# Patient Record
Sex: MALE | Race: ASIAN | NOT HISPANIC OR LATINO | Employment: UNEMPLOYED | URBAN - METROPOLITAN AREA
[De-identification: names, ages, dates, MRNs, and addresses within clinical notes are randomized per-mention and may not be internally consistent; named-entity substitution may affect disease eponyms.]

---

## 2018-02-19 ENCOUNTER — NON-PROVIDER VISIT (OUTPATIENT)
Dept: URGENT CARE | Facility: PHYSICIAN GROUP | Age: 33
End: 2018-02-19

## 2018-02-19 DIAGNOSIS — Z02.1 PRE-EMPLOYMENT DRUG SCREENING: ICD-10-CM

## 2018-02-19 LAB
AMP AMPHETAMINE: NORMAL
BAR BARBITURATES: NORMAL
BREATH ALCOHOL COMMENT: NORMAL
BZO BENZODIAZEPINES: NORMAL
COC COCAINE: NORMAL
INT CON NEG: NEGATIVE
INT CON POS: POSITIVE
MDMA ECSTASY: NORMAL
MET METHAMPHETAMINES: NORMAL
MTD METHADONE: NORMAL
OPI OPIATES: NORMAL
OXY OXYCODONE: NORMAL
PCP PHENCYCLIDINE: NORMAL
POC BREATHALIZER: 0 PERCENT (ref 0–0.01)
POC URINE DRUG SCREEN OCDRS: NORMAL
THC: NORMAL

## 2018-02-19 PROCEDURE — 82075 ASSAY OF BREATH ETHANOL: CPT | Performed by: FAMILY MEDICINE

## 2018-02-19 PROCEDURE — 80305 DRUG TEST PRSMV DIR OPT OBS: CPT | Performed by: FAMILY MEDICINE

## 2018-02-19 NOTE — PROGRESS NOTES
Ekta Stone is a 33 y.o. male here for a non-provider visit for PPD placement -- Step 1 of 2    Reason for PPD:  work requirement    1. TB evaluation questionnaire completed by patient? Yes      -  If any answers marked yes did you contact a provider prior to placing? Not Indicated  2.  Patient notified to return to clinic for reading on: 2/21/18- 2/22/18  3.  PPD Placement documentation completed on TB evaluation questionnaire? Yes  4.  Location of TB evaluation questionnaire filed:

## 2018-02-21 ENCOUNTER — OFFICE VISIT (OUTPATIENT)
Dept: OCCUPATIONAL MEDICINE | Facility: CLINIC | Age: 33
End: 2018-02-21

## 2018-02-21 ENCOUNTER — NON-PROVIDER VISIT (OUTPATIENT)
Dept: URGENT CARE | Facility: PHYSICIAN GROUP | Age: 33
End: 2018-02-21

## 2018-02-21 VITALS
HEART RATE: 82 BPM | BODY MASS INDEX: 37.22 KG/M2 | HEIGHT: 70 IN | RESPIRATION RATE: 16 BRPM | OXYGEN SATURATION: 96 % | TEMPERATURE: 98.1 F | WEIGHT: 260 LBS | DIASTOLIC BLOOD PRESSURE: 86 MMHG | SYSTOLIC BLOOD PRESSURE: 132 MMHG

## 2018-02-21 DIAGNOSIS — Z11.1 ENCOUNTER FOR PPD TEST: ICD-10-CM

## 2018-02-21 DIAGNOSIS — Z02.1 PHYSICAL EXAM, PRE-EMPLOYMENT: ICD-10-CM

## 2018-02-21 LAB — TB WHEAL 3D P 5 TU DIAM: 0 MM

## 2018-02-21 PROCEDURE — 86580 TB INTRADERMAL TEST: CPT | Performed by: PHYSICIAN ASSISTANT

## 2018-02-21 PROCEDURE — 8915 PR COMPREHENSIVE PHYSICAL: Performed by: PREVENTIVE MEDICINE

## 2018-02-21 NOTE — PROGRESS NOTES
Ekta Stone is a 33 y.o. male here for a non-provider visit for PPD reading -- Step 1 of 2.      1.  Resulted in Epic under enter/edit results? Yes   2.  TB evaluation questionnaire scanned into chart and original given to patient?yes    3. Was induration greater than 0 mm? No.    If Step 1 of 2, when is patient returning for second step (delete if N/A): 7 days    Routed to PCP? No

## 2018-02-26 ENCOUNTER — NON-PROVIDER VISIT (OUTPATIENT)
Dept: URGENT CARE | Facility: PHYSICIAN GROUP | Age: 33
End: 2018-02-26

## 2018-02-26 DIAGNOSIS — Z11.1 ENCOUNTER FOR PPD TEST: ICD-10-CM

## 2018-02-26 PROCEDURE — 86580 TB INTRADERMAL TEST: CPT | Performed by: PHYSICIAN ASSISTANT

## 2018-02-26 NOTE — PROGRESS NOTES
Ekta Stone is a 33 y.o. male here for a non-provider visit for PPD placement -- Step 1 of 2    Reason for PPD:  work requirement    1. TB evaluation questionnaire completed by patient? Yes      -  If any answers marked yes did you contact a provider prior to placing? Not Indicated  2.  Patient notified to return to clinic for reading on: 2/28/18  3.  PPD Placement documentation completed on TB evaluation questionnaire? Yes  4.  Location of TB evaluation questionnaire filed:

## 2018-02-28 ENCOUNTER — NON-PROVIDER VISIT (OUTPATIENT)
Dept: URGENT CARE | Facility: PHYSICIAN GROUP | Age: 33
End: 2018-02-28

## 2018-02-28 LAB — TB WHEAL 3D P 5 TU DIAM: NORMAL MM

## 2018-02-28 NOTE — PROGRESS NOTES
Ekta Stone is a 33 y.o. male here for a non-provider visit for PPD reading -- Step 2 of 2.      1.  Resulted in Epic under enter/edit results? Yes   2.  TB evaluation questionnaire scanned into chart and original given to patient?Yes      3. Was induration greater than 0 mm? 10mm       Routed to PCP? No

## 2019-03-13 PROCEDURE — 99285 EMERGENCY DEPT VISIT HI MDM: CPT

## 2019-03-13 PROCEDURE — 93005 ELECTROCARDIOGRAM TRACING: CPT | Performed by: EMERGENCY MEDICINE

## 2019-03-13 PROCEDURE — 93005 ELECTROCARDIOGRAM TRACING: CPT

## 2019-03-14 ENCOUNTER — APPOINTMENT (OUTPATIENT)
Dept: RADIOLOGY | Facility: MEDICAL CENTER | Age: 34
End: 2019-03-14
Attending: EMERGENCY MEDICINE

## 2019-03-14 ENCOUNTER — HOSPITAL ENCOUNTER (EMERGENCY)
Facility: MEDICAL CENTER | Age: 34
End: 2019-03-14
Attending: EMERGENCY MEDICINE

## 2019-03-14 VITALS
DIASTOLIC BLOOD PRESSURE: 112 MMHG | HEART RATE: 91 BPM | WEIGHT: 272.27 LBS | HEIGHT: 70 IN | BODY MASS INDEX: 38.98 KG/M2 | OXYGEN SATURATION: 95 % | RESPIRATION RATE: 2 BRPM | TEMPERATURE: 97.8 F | SYSTOLIC BLOOD PRESSURE: 156 MMHG

## 2019-03-14 DIAGNOSIS — R07.81 PLEURITIC CHEST PAIN: ICD-10-CM

## 2019-03-14 LAB
ALBUMIN SERPL BCP-MCNC: 4.5 G/DL (ref 3.2–4.9)
ALBUMIN/GLOB SERPL: 1.6 G/DL
ALP SERPL-CCNC: 73 U/L (ref 30–99)
ALT SERPL-CCNC: 36 U/L (ref 2–50)
ANION GAP SERPL CALC-SCNC: 7 MMOL/L (ref 0–11.9)
AST SERPL-CCNC: 27 U/L (ref 12–45)
BASOPHILS # BLD AUTO: 0.6 % (ref 0–1.8)
BASOPHILS # BLD: 0.06 K/UL (ref 0–0.12)
BILIRUB SERPL-MCNC: 0.6 MG/DL (ref 0.1–1.5)
BUN SERPL-MCNC: 16 MG/DL (ref 8–22)
CALCIUM SERPL-MCNC: 9.6 MG/DL (ref 8.5–10.5)
CHLORIDE SERPL-SCNC: 104 MMOL/L (ref 96–112)
CO2 SERPL-SCNC: 23 MMOL/L (ref 20–33)
CREAT SERPL-MCNC: 1.01 MG/DL (ref 0.5–1.4)
D DIMER PPP IA.FEU-MCNC: <0.4 UG/ML (FEU) (ref 0–0.5)
EKG IMPRESSION: NORMAL
EOSINOPHIL # BLD AUTO: 0.33 K/UL (ref 0–0.51)
EOSINOPHIL NFR BLD: 3.2 % (ref 0–6.9)
ERYTHROCYTE [DISTWIDTH] IN BLOOD BY AUTOMATED COUNT: 40.9 FL (ref 35.9–50)
GLOBULIN SER CALC-MCNC: 2.9 G/DL (ref 1.9–3.5)
GLUCOSE SERPL-MCNC: 113 MG/DL (ref 65–99)
HCT VFR BLD AUTO: 49.7 % (ref 42–52)
HGB BLD-MCNC: 16.9 G/DL (ref 14–18)
IMM GRANULOCYTES # BLD AUTO: 0.04 K/UL (ref 0–0.11)
IMM GRANULOCYTES NFR BLD AUTO: 0.4 % (ref 0–0.9)
LIPASE SERPL-CCNC: 26 U/L (ref 11–82)
LYMPHOCYTES # BLD AUTO: 4.11 K/UL (ref 1–4.8)
LYMPHOCYTES NFR BLD: 39.6 % (ref 22–41)
MCH RBC QN AUTO: 30.1 PG (ref 27–33)
MCHC RBC AUTO-ENTMCNC: 34 G/DL (ref 33.7–35.3)
MCV RBC AUTO: 88.4 FL (ref 81.4–97.8)
MONOCYTES # BLD AUTO: 0.96 K/UL (ref 0–0.85)
MONOCYTES NFR BLD AUTO: 9.2 % (ref 0–13.4)
NEUTROPHILS # BLD AUTO: 4.88 K/UL (ref 1.82–7.42)
NEUTROPHILS NFR BLD: 47 % (ref 44–72)
NRBC # BLD AUTO: 0 K/UL
NRBC BLD-RTO: 0 /100 WBC
PLATELET # BLD AUTO: 308 K/UL (ref 164–446)
PMV BLD AUTO: 8.9 FL (ref 9–12.9)
POTASSIUM SERPL-SCNC: 3.8 MMOL/L (ref 3.6–5.5)
PROT SERPL-MCNC: 7.4 G/DL (ref 6–8.2)
RBC # BLD AUTO: 5.62 M/UL (ref 4.7–6.1)
SODIUM SERPL-SCNC: 134 MMOL/L (ref 135–145)
TROPONIN I SERPL-MCNC: <0.01 NG/ML (ref 0–0.04)
WBC # BLD AUTO: 10.4 K/UL (ref 4.8–10.8)

## 2019-03-14 PROCEDURE — 36415 COLL VENOUS BLD VENIPUNCTURE: CPT

## 2019-03-14 PROCEDURE — 71046 X-RAY EXAM CHEST 2 VIEWS: CPT

## 2019-03-14 PROCEDURE — 80053 COMPREHEN METABOLIC PANEL: CPT

## 2019-03-14 PROCEDURE — 84484 ASSAY OF TROPONIN QUANT: CPT

## 2019-03-14 PROCEDURE — 93005 ELECTROCARDIOGRAM TRACING: CPT | Performed by: EMERGENCY MEDICINE

## 2019-03-14 PROCEDURE — 83690 ASSAY OF LIPASE: CPT

## 2019-03-14 PROCEDURE — 85379 FIBRIN DEGRADATION QUANT: CPT

## 2019-03-14 PROCEDURE — 85025 COMPLETE CBC W/AUTO DIFF WBC: CPT

## 2019-03-14 NOTE — ED PROVIDER NOTES
ED Provider Note    CHIEF COMPLAINT  Chief Complaint   Patient presents with   • Chest Pain     Pt. states centralized chest pressure for the past two days. Pt. states accompanying SOB. Pt. does not have an increased RR or any s/s of diff breathing.    • Back Pain     Pt. also states centralized chest pain that is non-radiating from his chest.        FRANCIA Stone is a 34 y.o. male who presents to the emergency department with chief complaint of 2 days of some centralized sharp stabbing pain that radiates to the back.  He denies any abdominal pain or nausea or vomiting.  [place] and is been feeling just generally short of breath feels like he does get a little worse when he goes up 2 flights of stairs but he denies any chest pain associated with this.  He denies any leg swelling or hemoptysis any fevers or chills.  He states he did just get over a cold about a month ago but states he is been doing better since then.  He does not smoke cigarettes he has not noticed any leg swelling no abdominal pain.    REVIEW OF SYSTEMS  Positives as above. Pertinent negatives include leg swelling hemoptysis abdominal pain nausea vomiting diaphoresis  All other review of systems are negative    PAST MEDICAL HISTORY       SOCIAL HISTORY  Social History     Social History Main Topics   • Smoking status: Never Smoker   • Smokeless tobacco: Never Used   • Alcohol use Yes      Comment: Occassionally   • Drug use: No   • Sexual activity: Not on file       SURGICAL HISTORY  patient denies any surgical history    CURRENT MEDICATIONS  Home Medications     Reviewed by Karen Karimi R.N. (Registered Nurse) on 03/13/19 at 2220  Med List Status: Partial   Medication Last Dose Status        Patient Isai Taking any Medications                       ALLERGIES  No Known Allergies    PHYSICAL EXAM  VITAL SIGNS: /112   Pulse (!) 105   Temp 36.6 °C (97.8 °F)  "(Temporal)   Resp 18   Ht 1.778 m (5' 10\")   Wt 123.5 kg (272 lb 4.3 oz)   SpO2 100%   BMI 39.07 kg/m²    Pulse ox interpretation: I interpret this pulse ox as normal.  Constitutional: Alert in no apparent distress.  HENT: Normocephalic atraumatic, MMM  Eyes: PER, Conjunctiva normal, Non-icteric.   Neck: Normal range of motion, No tenderness, Supple, No stridor.   Cardiovascular: Regular rate and rhythm, no murmurs.   Thorax & Lungs: Normal breath sounds, No respiratory distress, No wheezing, No chest tenderness.   Abdomen: Bowel sounds normal, Soft, No tenderness, No pulsatile masses. No peritoneal signs.  Skin: Warm, Dry, No erythema, No rash.   Back: No bony tenderness, No CVA tenderness.   Extremities: Intact distal pulses, No edema, No tenderness, No cyanosis  Neurologic: Alert and oriented x3, No focal deficits noted.   Psychiatric: Affect normal, Judgment normal, Mood normal.       DIFFERENTIAL DIAGNOSIS AND WORK UP PLAN    This is a 34 y.o. male who presents with some atypical chest pain rating to the back he is otherwise well-appearing he was tachycardic on arrival and when he gets a little anxious his heart rate goes up but otherwise his examination is within normal limits no abdominal tenderness normal distal pulses low concern for ACS was could be an underlying pulmonary embolism versus just getting acclimated to the elevation versus bronchitis esophagitis peptic ulcer disease.  Will evaluate with EKG laboratory analysis chest x-ray and reassess  Patient states he is already feeling better knowing that his EKG looked okay which is what the triage nurse told him    DIAGNOSTIC STUDIES / PROCEDURES    EKG  Results for orders placed or performed during the hospital encounter of 03/14/19   EKG   Result Value Ref Range    Report       [facility] Emergency Dept.    Test Date:  2019-03-14  Pt Name:    ERICK WALLACE               Department: ER  MRN:        [id number]                      " "Room:       [facility]  Gender:     Male                         Technician: [id number]  :        1985                   Requested By:SONIYA GUARDADO  Order #:    [id number]                    Reading MD: Soniya Guardado MD    Measurements  Intervals                                Axis  Rate:       80                           P:          -8  AK:         180                          QRS:        36  QRSD:       72                           T:          9  QT:         364  QTc:        420    Interpretive Statements  SINUS RHYTHM - no st elevations or depression no abnormal t wave inversions,  normal intervals normal axis, baseline artiface I, II and III  Compared to ECG 2019 22:26:09  No significant changes    Electronically Signed On 3- 2:55:07 PDT by Soniya Guardado MD         LABS  Pertinent Lab Findings  CBC CMP lipase troponin d-dimer all within normal limits      RADIOLOGY  DX-CHEST-2 VIEWS   Final Result      Negative two views of the chest.        The radiologist's interpretation of all radiological studies have been reviewed by me.      COURSE & MEDICAL DECISION MAKING  Pertinent Labs & Imaging studies reviewed. (See chart for details)    2:50 AM  Reassessed patient the bedside he is resting comfortably he is having some pleuritic chest pain with no hypoxia no evidence of pulmonary embolism he had the pain on and off for 2 days with single troponin should be sufficient as well as an EKG which is within normal limits.  His heart score is 3 but is otherwise well-appearing.  I discussed with him this may be secondary to his recent infection which is leading to the pleurisy or the change in the altitude but he should take it easy I would not recommend sending any higher than is currently at follow-up with his primary care provider and return to the emergency department for any new or worsening symptoms    /89  Pulse 91   Temp 36.6 °C (97.8 °F) (Temporal)   Resp (!) 2   Ht 1.778 m (5' 10\")   " Wt 123.5 kg (272 lb 4.3 oz)   SpO2 95%   BMI 39.07 kg/m²       The patient will return for new or worsening symptoms and is stable at the time of discharge.    The patient is referred to a primary physician for blood pressure management, diabetic screening, and for all other preventative health concerns.    DISPOSITION:  Patient will be discharged home in stable condition.    FOLLOW UP:  [facility], Emergency Dept  [address] [facility] [address] [phone]    If symptoms worsen    [address] [address]  [phone]  Schedule an appointment as soon as possible for a visit   for primary care follow up      OUTPATIENT MEDICATIONS:  There are no discharge medications for this patient.      FINAL IMPRESSION  1. Pleuritic chest pain        Electronically signed by: Soniya Guajardo, 3/14/2019 12:46 AM    This dictation has been created using voice recognition software and/or scribes. The accuracy of the dictation is limited by the abilities of the software and the expertise of the scribes. I expect there may be some errors of grammar and possibly content. I made every attempt to manually correct the errors within my dictation. However, errors related to voice recognition software and/or scribes may still exist and should be interpreted within the appropriate context.

## 2019-03-14 NOTE — ED NOTES
Pt resting on cart in low position with call light in reach. Pt remains on ccm/pulse ox. Pt denies needs at this time. Will continue to monitor.

## 2019-03-14 NOTE — ED TRIAGE NOTES
"Ekta Stone  34 y.o. male  Chief Complaint   Patient presents with   • Chest Pain     Pt. states centralized chest pressure for the past two days. Pt. states accompanying SOB. Pt. does not have an increased RR or any s/s of diff breathing.    • Back Pain     Pt. also states centralized chest pain that is non-radiating from his chest.      /112   Pulse (!) 105   Temp 36.6 °C (97.8 °F) (Temporal)   Resp 18   Ht 1.778 m (5' 10\")   Wt 123.5 kg (272 lb 4.3 oz)   SpO2 100%   BMI 39.07 kg/m²     Pt amb to triage with steady gait for above complaint. EKG to be completed in triage.   Pt is alert and oriented, speaking in full sentences, follows commands and responds appropriately to questions. NAD. Resp are even and unlabored.  Pt placed in lobby. Pt educated on triage process. Pt encouraged to alert staff for any changes.  "